# Patient Record
Sex: MALE | Race: WHITE | NOT HISPANIC OR LATINO | Employment: OTHER | URBAN - METROPOLITAN AREA
[De-identification: names, ages, dates, MRNs, and addresses within clinical notes are randomized per-mention and may not be internally consistent; named-entity substitution may affect disease eponyms.]

---

## 2017-02-01 ENCOUNTER — NON-PROVIDER VISIT (OUTPATIENT)
Dept: NEUROLOGY | Facility: MEDICAL CENTER | Age: 77
End: 2017-02-01
Payer: MEDICARE

## 2017-02-01 DIAGNOSIS — R56.9 SEIZURES (HCC): ICD-10-CM

## 2017-02-01 PROCEDURE — 95951 PR EEG MONITORING/VIDEORECORD: CPT | Mod: 52 | Performed by: PSYCHIATRY & NEUROLOGY

## 2017-02-01 NOTE — MR AVS SNAPSHOT
Vidal Bermudez   2017 1:00 PM   Non-Provider Visit   MRN: [id number]    Department:  [facility]   Dept Phone:  [phone]    Description:  Male : 1940   [place]  [facility] [place] for Visit     Procedure           Allergies as of 2017     Allergen Noted Reactions    Pcn [Penicillins] 2016         You were diagnosed with     Seizures (CMS-HCC)   [787307]         Vital Signs     Smoking Status                   Former Smoker           Basic Information     Date Of Birth Sex Race Ethnicity Preferred Language    1940 Male White Non- English      Problem List              ICD-10-CM Priority Class Noted - Resolved    Subdural hematoma (CMS-[facility]) I62.00   2016 - Present    HTN (hypertension) (Chronic) I10   9/15/2016 - Present    Dyslipidemia (Chronic) E78.5   9/15/2016 - Present    Hyponatremia E87.1   2016 - Present    Facial droop R29.810   2016 - Present    Focal seizure (CMS-HCC) R56.9   2016 - Present      Health Maintenance     Patient has no pending health maintenance at this time      Current Immunizations     Pneumococcal Vaccine (UF)Historical Data 2016      Below and/or attached are the medications your provider expects you to take. Review all of your home medications and newly ordered medications with your provider and/or pharmacist. Follow medication instructions as directed by your provider and/or pharmacist. Please keep your medication list with you and share with your provider. Update the information when medications are discontinued, doses are changed, or new medications (including over-the-counter products) are added; and carry medication information at all times in the event of emergency situations     Allergies:  PCN - (reactions not documented)               Medications  Valid as of: 2017 -  2:05 PM    Generic Name Brand Name Tablet Size Instructions for use    Atorvastatin Calcium (Tab)  LIPITOR 40 MG Take 40 mg by mouth every day.        Butalbital-APAP-Caff-Cod (Cap) FIORICET W/CODEINE -27-30 MG Take 1 Cap by mouth every 6 hours as needed for Headache.        Cholecalciferol (Tab) cholecalciferol 1000 UNIT Take 1,000 Units by mouth every day.        Lacosamide (Tab) VIMPAT 50 MG Take 1 Tab by mouth 2 Times a Day.        LevETIRAcetam (Tab) KEPPRA 1000 MG Take 1 Tab by mouth 2 Times a Day.        Magnesium Oxide (Tab) MAG- (241.3 MG) MG Take 1 Tab by mouth every day.        Multiple Vitamins-Minerals (Tab) CENTRUM SILVER ADULT 50+  Take 1 Tab by mouth every day.        Oyster Shell (Tab) OS-ALBERTO 500 500 MG Take 500 mg by mouth 2 times a day, with meals.        Sodium Chloride (Tab) SALT 1 GM Take 1 Tab by mouth every day.        Zolpidem Tartrate (Tab) AMBIEN 5 MG Take 5 mg by mouth at bedtime as needed for Sleep.        .                 Medicines prescribed today were sent to:     None      Medication refill instructions:       If your prescription bottle indicates you have medication refills left, it is not necessary to call your provider’s office. Please contact your pharmacy and they will refill your medication.    If your prescription bottle indicates you do not have any refills left, you may request refills at any time through one of the following ways: The online [employer] system (except Urgent Care), by calling your provider’s office, or by asking your pharmacy to contact your provider’s office with a refill request. Medication refills are processed only during regular business hours and may not be available until the next business day. Your provider may request additional information or to have a follow-up visit with you prior to refilling your medication.   *Please Note: Medication refills are assigned a new Rx number when refilled electronically. Your pharmacy may indicate that no refills were authorized even though a new prescription for the same medication is available at the  pharmacy. Please request the medicine by name with the pharmacy before contacting your provider for a refill.           Manage[employer] Access Code: [email or web address]  Expires: 3/3/2017 12:17 PM    ManageSocial  A secure, online tool to manage your health information     [employer] ManageSocial® is a secure, online tool that connects you to your personalized health information from the privacy of your home -- day or night - making it very easy for you to manage your healthcare. Once the activation process is completed, you can even access your medical information using the ManageSocial júnior, which is available for free in the Apple Júnior store or Google Play store.     ManageSocial provides the following levels of access (as shown below):   My Chart Features   [facility] Doctor [facility]  Urgent  Care [facility]  Primary Care  Doctor   Email your healthcare team securely and privately 24/7 X X X    Manage appointments: schedule your next appointment; view details of past/upcoming appointments X      Request prescription refills. X      View recent personal medical records, including lab and immunizations X X X X   View health record, including health history, allergies, medications X X X X   Read reports about your outpatient visits, procedures, consult and ER notes X X X X   See your discharge summary, which is a recap of your hospital and/or ER visit that includes your diagnosis, lab results, and care plan. X X       How to register for ManageSocial:  1. Go to  [email or web address].  2. Click on the Sign Up Now box, which takes you to the New Member Sign Up page. You will need to provide the following information:  a. Enter your ManageSocial Access Code exactly as it appears at the top of this page. (You will not need to use this code after you’ve completed the sign-up process. If you do not sign up before the expiration date, you must request a new code.)   b. Enter your date of birth.   c. Enter your home email  address.   d. Click Submit, and follow the next screen’s instructions.  3. Create a [employer] ID. This will be your [facility] login ID and cannot be changed, so think of one that is secure and easy to remember.  4. Create a [employer] password. You can change your password at any time.  5. Enter your Password Reset Question and Answer. This can be used at a later time if you forget your password.   6. Enter your e-mail address. This allows you to receive e-mail notifications when new information is available in [facility].  7. Click Sign Up. You can now view your health information.    For assistance activating your [facility] account, call [phone]

## 2017-02-04 NOTE — PROCEDURES
DATE OF SERVICE:  02/01/2017    A 76-year-old patient with a history of subdural hematoma on the right side   and had focal seizure associated with dysarthria, slurred speech and facial   Weakness.    The EEG was done with a [employer],  Twenty One electrodes were applied based on the International 10-20 system.    The EEG was reviewed with technique of montage Reformatting including referential and bipolar montage.      The EEG background consists of positive dominant rhythm around 10-11 Hz.    There are monomorphic delta burst and some sharp waves more persistent over   the left hemisphere and the EEG activity amplitude was higher in the right   hemisphere, which could be secondary to the breach rhythm.  Stage I sleep was   obtained.    INTERPRETATION:  This EEG is consistent with a focal cortical dysfunction with  Focus over the left hemisphere temporal lobe and with potential focal seizure  disorder,  from the left temporal as well.       ____________________________________     MD DOROTHY SUTHERLAND / [place]    DD:  02/03/2017 09:44:26  DT:  02/03/2017 13:01:38    D#:  [id number]  Job#:  [id number]    cc: Primary Care Physician